# Patient Record
Sex: MALE | Race: WHITE | HISPANIC OR LATINO | Employment: UNEMPLOYED | URBAN - METROPOLITAN AREA
[De-identification: names, ages, dates, MRNs, and addresses within clinical notes are randomized per-mention and may not be internally consistent; named-entity substitution may affect disease eponyms.]

---

## 2021-09-20 ENCOUNTER — HISTORICAL (OUTPATIENT)
Dept: RADIOLOGY | Facility: HOSPITAL | Age: 36
End: 2021-09-20

## 2021-10-14 ENCOUNTER — HISTORICAL (OUTPATIENT)
Dept: LAB | Facility: HOSPITAL | Age: 36
End: 2021-10-14

## 2023-06-13 ENCOUNTER — HOSPITAL ENCOUNTER (EMERGENCY)
Facility: HOSPITAL | Age: 38
Discharge: LEFT BEFORE DISCHARGE | End: 2023-06-13
Attending: EMERGENCY MEDICINE
Payer: MEDICAID

## 2023-06-13 VITALS
TEMPERATURE: 99 F | HEART RATE: 68 BPM | SYSTOLIC BLOOD PRESSURE: 127 MMHG | WEIGHT: 126.81 LBS | BODY MASS INDEX: 21.13 KG/M2 | DIASTOLIC BLOOD PRESSURE: 76 MMHG | HEIGHT: 65 IN | OXYGEN SATURATION: 100 % | RESPIRATION RATE: 18 BRPM

## 2023-06-13 DIAGNOSIS — R68.84 JAW PAIN: Primary | ICD-10-CM

## 2023-06-13 PROCEDURE — 99283 EMERGENCY DEPT VISIT LOW MDM: CPT
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
## 2023-06-26 ENCOUNTER — HOSPITAL ENCOUNTER (EMERGENCY)
Facility: HOSPITAL | Age: 38
Discharge: HOME | End: 2023-06-26
Attending: STUDENT IN AN ORGANIZED HEALTH CARE EDUCATION/TRAINING PROGRAM
Payer: MEDICAID

## 2023-06-26 VITALS
HEART RATE: 70 BPM | OXYGEN SATURATION: 99 % | SYSTOLIC BLOOD PRESSURE: 128 MMHG | TEMPERATURE: 98 F | RESPIRATION RATE: 16 BRPM | HEIGHT: 65 IN | WEIGHT: 125 LBS | DIASTOLIC BLOOD PRESSURE: 72 MMHG | BODY MASS INDEX: 20.83 KG/M2

## 2023-06-26 DIAGNOSIS — R42 DIZZINESS: ICD-10-CM

## 2023-06-26 DIAGNOSIS — R51.9 NONINTRACTABLE HEADACHE, UNSPECIFIED CHRONICITY PATTERN, UNSPECIFIED HEADACHE TYPE: Primary | ICD-10-CM

## 2023-06-26 LAB
ALBUMIN SERPL-MCNC: 4 G/DL (ref 3.5–5)
ALBUMIN/GLOB SERPL: 1.4 RATIO (ref 1.1–2)
ALP SERPL-CCNC: 93 UNIT/L (ref 40–150)
ALT SERPL-CCNC: 34 UNIT/L (ref 0–55)
AST SERPL-CCNC: 27 UNIT/L (ref 5–34)
BASOPHILS # BLD AUTO: 0.08 X10(3)/MCL
BASOPHILS NFR BLD AUTO: 1.1 %
BILIRUBIN DIRECT+TOT PNL SERPL-MCNC: 0.3 MG/DL
BUN SERPL-MCNC: 23.4 MG/DL (ref 8.9–20.6)
CALCIUM SERPL-MCNC: 9 MG/DL (ref 8.4–10.2)
CHLORIDE SERPL-SCNC: 108 MMOL/L (ref 98–107)
CO2 SERPL-SCNC: 26 MMOL/L (ref 22–29)
CREAT SERPL-MCNC: 0.81 MG/DL (ref 0.73–1.18)
EOSINOPHIL # BLD AUTO: 0.45 X10(3)/MCL (ref 0–0.9)
EOSINOPHIL NFR BLD AUTO: 6.1 %
ERYTHROCYTE [DISTWIDTH] IN BLOOD BY AUTOMATED COUNT: 13.5 % (ref 11.5–17)
GFR SERPLBLD CREATININE-BSD FMLA CKD-EPI: >60 MLS/MIN/1.73/M2
GLOBULIN SER-MCNC: 2.9 GM/DL (ref 2.4–3.5)
GLUCOSE SERPL-MCNC: 73 MG/DL (ref 74–100)
HCT VFR BLD AUTO: 42 % (ref 42–52)
HGB BLD-MCNC: 14.4 G/DL (ref 14–18)
IMM GRANULOCYTES # BLD AUTO: 0.02 X10(3)/MCL (ref 0–0.04)
IMM GRANULOCYTES NFR BLD AUTO: 0.3 %
LYMPHOCYTES # BLD AUTO: 3 X10(3)/MCL (ref 0.6–4.6)
LYMPHOCYTES NFR BLD AUTO: 40.5 %
MAGNESIUM SERPL-MCNC: 1.9 MG/DL (ref 1.6–2.6)
MCH RBC QN AUTO: 29.1 PG (ref 27–31)
MCHC RBC AUTO-ENTMCNC: 34.3 G/DL (ref 33–36)
MCV RBC AUTO: 84.8 FL (ref 80–94)
MONOCYTES # BLD AUTO: 0.67 X10(3)/MCL (ref 0.1–1.3)
MONOCYTES NFR BLD AUTO: 9.1 %
NEUTROPHILS # BLD AUTO: 3.18 X10(3)/MCL (ref 2.1–9.2)
NEUTROPHILS NFR BLD AUTO: 42.9 %
NRBC BLD AUTO-RTO: 0 %
PLATELET # BLD AUTO: 282 X10(3)/MCL (ref 130–400)
PMV BLD AUTO: 9.9 FL (ref 7.4–10.4)
POTASSIUM SERPL-SCNC: 4.3 MMOL/L (ref 3.5–5.1)
PROT SERPL-MCNC: 6.9 GM/DL (ref 6.4–8.3)
RBC # BLD AUTO: 4.95 X10(6)/MCL (ref 4.7–6.1)
SODIUM SERPL-SCNC: 140 MMOL/L (ref 136–145)
TROPONIN I SERPL-MCNC: <0.01 NG/ML (ref 0–0.04)
WBC # SPEC AUTO: 7.4 X10(3)/MCL (ref 4.5–11.5)

## 2023-06-26 PROCEDURE — 99285 EMERGENCY DEPT VISIT HI MDM: CPT | Mod: 25

## 2023-06-26 PROCEDURE — 83735 ASSAY OF MAGNESIUM: CPT | Performed by: STUDENT IN AN ORGANIZED HEALTH CARE EDUCATION/TRAINING PROGRAM

## 2023-06-26 PROCEDURE — 96375 TX/PRO/DX INJ NEW DRUG ADDON: CPT

## 2023-06-26 PROCEDURE — 85025 COMPLETE CBC W/AUTO DIFF WBC: CPT | Performed by: STUDENT IN AN ORGANIZED HEALTH CARE EDUCATION/TRAINING PROGRAM

## 2023-06-26 PROCEDURE — 63600175 PHARM REV CODE 636 W HCPCS: Performed by: STUDENT IN AN ORGANIZED HEALTH CARE EDUCATION/TRAINING PROGRAM

## 2023-06-26 PROCEDURE — 96361 HYDRATE IV INFUSION ADD-ON: CPT

## 2023-06-26 PROCEDURE — 84484 ASSAY OF TROPONIN QUANT: CPT | Performed by: STUDENT IN AN ORGANIZED HEALTH CARE EDUCATION/TRAINING PROGRAM

## 2023-06-26 PROCEDURE — 93010 ELECTROCARDIOGRAM REPORT: CPT | Mod: ,,, | Performed by: STUDENT IN AN ORGANIZED HEALTH CARE EDUCATION/TRAINING PROGRAM

## 2023-06-26 PROCEDURE — 25000003 PHARM REV CODE 250: Performed by: STUDENT IN AN ORGANIZED HEALTH CARE EDUCATION/TRAINING PROGRAM

## 2023-06-26 PROCEDURE — 93010 EKG 12-LEAD: ICD-10-PCS | Mod: ,,, | Performed by: STUDENT IN AN ORGANIZED HEALTH CARE EDUCATION/TRAINING PROGRAM

## 2023-06-26 PROCEDURE — 96374 THER/PROPH/DIAG INJ IV PUSH: CPT

## 2023-06-26 PROCEDURE — 80053 COMPREHEN METABOLIC PANEL: CPT | Performed by: STUDENT IN AN ORGANIZED HEALTH CARE EDUCATION/TRAINING PROGRAM

## 2023-06-26 PROCEDURE — 93005 ELECTROCARDIOGRAM TRACING: CPT

## 2023-06-26 RX ORDER — ACETAMINOPHEN 10 MG/ML
1000 INJECTION, SOLUTION INTRAVENOUS ONCE
Status: COMPLETED | OUTPATIENT
Start: 2023-06-26 | End: 2023-06-26

## 2023-06-26 RX ORDER — DIPHENHYDRAMINE HYDROCHLORIDE 50 MG/ML
50 INJECTION INTRAMUSCULAR; INTRAVENOUS
Status: COMPLETED | OUTPATIENT
Start: 2023-06-26 | End: 2023-06-26

## 2023-06-26 RX ORDER — ASPIRIN 81 MG/1
81 TABLET ORAL DAILY
Qty: 30 TABLET | Refills: 0 | Status: SHIPPED | OUTPATIENT
Start: 2023-06-26 | End: 2023-07-26

## 2023-06-26 RX ORDER — MECLIZINE HYDROCHLORIDE 25 MG/1
25 TABLET ORAL 3 TIMES DAILY PRN
Qty: 20 TABLET | Refills: 0 | Status: SHIPPED | OUTPATIENT
Start: 2023-06-26

## 2023-06-26 RX ORDER — KETOROLAC TROMETHAMINE 30 MG/ML
30 INJECTION, SOLUTION INTRAMUSCULAR; INTRAVENOUS
Status: COMPLETED | OUTPATIENT
Start: 2023-06-26 | End: 2023-06-26

## 2023-06-26 RX ORDER — ONDANSETRON 2 MG/ML
8 INJECTION INTRAMUSCULAR; INTRAVENOUS
Status: COMPLETED | OUTPATIENT
Start: 2023-06-26 | End: 2023-06-26

## 2023-06-26 RX ORDER — PROCHLORPERAZINE EDISYLATE 5 MG/ML
10 INJECTION INTRAMUSCULAR; INTRAVENOUS
Status: COMPLETED | OUTPATIENT
Start: 2023-06-26 | End: 2023-06-26

## 2023-06-26 RX ORDER — MECLIZINE HYDROCHLORIDE 25 MG/1
50 TABLET ORAL
Status: COMPLETED | OUTPATIENT
Start: 2023-06-26 | End: 2023-06-26

## 2023-06-26 RX ADMIN — MECLIZINE HYDROCHLORIDE 50 MG: 25 TABLET ORAL at 10:06

## 2023-06-26 RX ADMIN — PROCHLORPERAZINE EDISYLATE 10 MG: 5 INJECTION INTRAMUSCULAR; INTRAVENOUS at 11:06

## 2023-06-26 RX ADMIN — KETOROLAC TROMETHAMINE 30 MG: 30 INJECTION, SOLUTION INTRAMUSCULAR; INTRAVENOUS at 11:06

## 2023-06-26 RX ADMIN — DIPHENHYDRAMINE HYDROCHLORIDE 50 MG: 50 INJECTION INTRAMUSCULAR; INTRAVENOUS at 11:06

## 2023-06-26 RX ADMIN — ACETAMINOPHEN 1000 MG: 10 INJECTION, SOLUTION INTRAVENOUS at 10:06

## 2023-06-26 RX ADMIN — ONDANSETRON 8 MG: 2 INJECTION INTRAMUSCULAR; INTRAVENOUS at 09:06

## 2023-06-26 RX ADMIN — SODIUM CHLORIDE, POTASSIUM CHLORIDE, SODIUM LACTATE AND CALCIUM CHLORIDE 1000 ML: 600; 310; 30; 20 INJECTION, SOLUTION INTRAVENOUS at 11:06

## 2023-06-26 NOTE — ED PROVIDER NOTES
Encounter Date: 6/26/2023    SCRIBE #1 NOTE: I, Mirela Hoffman, am scribing for, and in the presence of,  Gordy Pan IV, MD. I have scribed the following portions of the note - Other sections scribed: HPI, ROS and physical.     History     Chief Complaint   Patient presents with    Shortness of Breath     C/O SOB and dizziness with nausea x4 weeks. Denies CP. EMS states quit meth 3 weeks ago     This is a 39 y/o male with a medial hx of bipolar disorder and substance abuse (methamphetamine) that presents to the ED for headache, dizziness and intermittent SOB. The pt states that the dizziness is worse with movement, much improved when laying still. He reports that it feels as if the room is spinning. The pt's spouse notes that he has been having issues with dizziness for years, but has never been diagnosed with Vertigo, etc.     Per Chart review: the pt was seen in the ED for jaw pain two weeks ago and eloped.     The history is provided by the patient, the EMS personnel and the spouse. No  was used.   Review of patient's allergies indicates:  No Known Allergies  Past Medical History:   Diagnosis Date    Bipolar 1 disorder      No past surgical history on file.  No family history on file.  Social History     Tobacco Use    Smoking status: Every Day     Packs/day: 1.00     Types: Cigarettes   Substance Use Topics    Alcohol use: Yes     Comment: Occasional    Drug use: Yes     Types: Methamphetamines     Comment: Heroin     Review of Systems   Constitutional:  Negative for chills and fever.   HENT:  Negative for congestion, rhinorrhea and sore throat.    Eyes:  Negative for visual disturbance.   Respiratory:  Positive for shortness of breath. Negative for cough.    Cardiovascular:  Negative for chest pain.   Gastrointestinal:  Negative for abdominal pain, nausea and vomiting.   Genitourinary:  Negative for dysuria and hematuria.   Musculoskeletal:  Negative for joint swelling.   Skin:  Negative  for rash.   Neurological:  Positive for dizziness and headaches. Negative for weakness.   Psychiatric/Behavioral:  Negative for confusion.    All other systems reviewed and are negative.    Physical Exam     Initial Vitals [06/26/23 0833]   BP Pulse Resp Temp SpO2   123/84 88 14 98.3 °F (36.8 °C) 99 %      MAP       --         Physical Exam    Nursing note and vitals reviewed.  Constitutional: He is not diaphoretic. No distress.   Cardiovascular:  Normal rate and regular rhythm.           Pulmonary/Chest: No respiratory distress.   Abdominal: Abdomen is soft. He exhibits no distension. There is no abdominal tenderness.     Neurological: He is alert and oriented to person, place, and time. He has normal strength. No cranial nerve deficit or sensory deficit.   Psychiatric: He has a normal mood and affect.       ED Course   Procedures  Labs Reviewed   COMPREHENSIVE METABOLIC PANEL - Abnormal; Notable for the following components:       Result Value    Chloride 108 (*)     Glucose Level 73 (*)     Blood Urea Nitrogen 23.4 (*)     All other components within normal limits   MAGNESIUM - Normal   TROPONIN I - Normal   CBC W/ AUTO DIFFERENTIAL    Narrative:     The following orders were created for panel order CBC auto differential.  Procedure                               Abnormality         Status                     ---------                               -----------         ------                     CBC with Differential[[id number]]                            Final result                 Please view results for these tests on the individual orders.   CBC WITH DIFFERENTIAL     EKG Readings: (Independently Interpreted)   Initial Reading: No STEMI. Rhythm: Normal Sinus Rhythm. Heart Rate: 83. Ectopy: No Ectopy. Conduction: Normal. ST Segments: Normal ST Segments. T Waves: Normal.   Time 0852     Imaging Results              CT Head Without Contrast (Final result)  Result time 06/26/23 10:09:55      Final result by Dread GARCIA  MD Johana (06/26/23 10:09:55)                   Impression:      No acute intracranial findings.      Electronically signed by: Dread Vaughn  Date:    06/26/2023  Time:    10:09               Narrative:    EXAMINATION:  CT HEAD WITHOUT CONTRAST    CLINICAL HISTORY:  Dizziness, persistent/recurrent, cardiac or vascular cause suspected;    TECHNIQUE:  CT imaging of the head performed from the skull base to the vertex without intravenous contrast.  mGycm. Automatic exposure control, adjustment of mA/kV or iterative reconstruction technique was used to reduce radiation.    COMPARISON:  27 April 2019    FINDINGS:  There is no acute cortical infarct, hemorrhage or mass lesion.  The ventricles are normal in size.    Visualized paranasal sinuses and mastoid air cells are clear.                                       Medications   ondansetron injection 8 mg (8 mg Intravenous Given 6/26/23 0907)   acetaminophen 1,000 mg/100 mL (10 mg/mL) injection 1,000 mg (0 mg Intravenous Stopped 6/26/23 1109)   meclizine tablet 50 mg (50 mg Oral Given 6/26/23 1054)   prochlorperazine injection Soln 10 mg (10 mg Intravenous Given 6/26/23 1143)   diphenhydrAMINE injection 50 mg (50 mg Intravenous Given 6/26/23 1143)   lactated ringers bolus 1,000 mL (0 mLs Intravenous Stopped 6/26/23 1243)   ketorolac injection 30 mg (30 mg Intravenous Given 6/26/23 1143)              Scribe Attestation:   Scribe #1: I performed the above scribed service and the documentation accurately describes the services I performed. I attest to the accuracy of the note.    Attending Attestation:           Physician Attestation for Scribe:  Physician Attestation Statement for Scribe #1: I, Gordy Pan IV, MD, reviewed documentation, as scribed by Mirela Hoffman in my presence, and it is both accurate and complete.       Medical Decision Making  Problems Addressed:  Dizziness: acute illness or injury that poses a threat to life or bodily  functions  Nonintractable headache, unspecified chronicity pattern, unspecified headache type: acute illness or injury that poses a threat to life or bodily functions      ED assessment:    37 yo with history of polysubstance use presenting for dizziness, headache, other vague symptoms  Reports to me he gave up meth 3 weeks ago  Dizziness described as vertiginous  Neuro exam wnl, able to ambulate unassisted  CT head wnl  Plan was for admission for posterior circulation stroke r/o if symptoms not resolved  Treated with antiemetics, meclizine, headache cocktail with resolution of symptoms, patient coming out of room asking to go home at that point   Discharged with PRN meds, PCP follow up      Differential diagnosis (including but not limited to):   Electrolyte derangement, dehydration, peripheral vertigo, ICH, CVA    ED management:   IV meclizine  IVF  Headache cocktail  Antiemetics     My independent radiology interpretation:   Head CT - no obvious bleed or mass     Amount and/or Complexity of Data Reviewed  Independent historian: EMS   Summary of history: EMS reports that the pt has been clean from methamphetamine for three weeks and has been experiencing dizziness  External data reviewed: notes from previous admissions and notes from previous ED visits  Summary of data reviewed: see ED course   Risk and benefits of testing: discussed   Labs: ordered and reviewed  Radiology: ordered and independent interpretation performed (see above or ED course)  ECG/medicine tests: ordered and independent interpretation performed (see above or ED course)    Risk  Prescription drug management   Diagnosis or treatment significantly impacted by social determinants of health: psychiatric illness  Shared decision making     Critical Care  none    Gordy LEDEZMA MD personally performed the history, PE, MDM, and procedures as documented above and agree with the scribe's documentation.          ED Course as of 06/26/23 1815   Mon Wiliam  26, 2023   1127 Patient is now complaining of a right-sided headache CT scan unremarkable will treat with a headache cocktail at this time and reassess. [AC]   1310 Reassessment: Pt is ambulating out of his room with steady gait. He states that he is ready to go home.  [SJ]      ED Course User Index  [AC] Gordy Pan IV, MD  [SJ] Mirela Hoffman                 Clinical Impression:   Final diagnoses:  [R42] Dizziness  [R51.9] Nonintractable headache, unspecified chronicity pattern, unspecified headache type (Primary)        ED Disposition Condition    Discharge Stable          ED Prescriptions       Medication Sig Dispense Start Date End Date Auth. Provider    aspirin (ECOTRIN) 81 MG EC tablet Take 1 tablet (81 mg total) by mouth once daily. 30 tablet 6/26/2023 7/26/2023 Gordy Pan IV, MD    meclizine (ANTIVERT) 25 mg tablet Take 1 tablet (25 mg total) by mouth 3 (three) times daily as needed. 20 tablet 6/26/2023 -- Gordy Pan IV, MD          Follow-up Information       Follow up With Specialties Details Why Contact Info    [facility] - Emergency Dept Emergency Medicine Go to  If symptoms worsen [address] [facility] [address] [phone]    Primary care physician  Schedule an appointment as soon as possible for a visit   Follow up with you primary care physician.   If you do not have a primary care physician call [phone] to schedule an appointment.             Gordy Pan IV, MD  06/26/23 9609

## 2023-06-26 NOTE — DISCHARGE INSTRUCTIONS
Agency:  Contact Information:  Services Provided:  Insurance Accepted:    [facility] [address] [place] [address]  [phone] Adults: Detox; inpatient; outpatient; partial inpatient Private Insurance    [facility] [place] [address], [address]th [place] [address]  [phone] Adults: Detox; inpatient Private Insurance   [facility] [address] [place] [address]  [phone] Adults and Adolescents (13-17): Inpatient; outpatient; transitional living; family therapy Medicare  Medicaid  Private Pay  [employer] (Uninsured)    [address] [place] [address]  [phone] Adults: Detox; inpatient; pregnant women Medicaid  Private Insurance   [facility] [address] [place] [address]  [phone] Adults: Detox; Detox for pregnant women Medicare    Private Insurance  Medicaid   [facility] [address] [place] [address]  [phone] Adults: Detox; inpatient; group therapy Medicare  Medicaid  Private Insurance   [facility] [address] [place], [place] (Adult)     [address] [place], [place] (Adolescent)    [phone] (Weekdays)  [phone] (Weekends) Adults and Adolescents (12-17): Inpatient; outpatient  Family: Pregnant women and women w/ kids up to 12-residental living recovery  Medicaid  Medicare  Private Insurance   [facility] [address] [place] [address]  [phone]. [phone] Adults: Detox; Inpatient Medicaid  Private Insurance   [facility] [address]  [place] [address]  [phone] Adults: Outpatient Private Insurance    [facility] [address] [place] [address]  [phone] Adult: Detox; inpatient Medicaid  Medicare    Private Insurance   [facility] [address]  [place] [address]  [phone] Adults: Outpatient   Medicaid  Private Insurance  Self-pay   [facility] [address] [place] [address]  [phone] Adults: Detox; Inpatient   Medicaid  Medicare  Private Insurance  VA Benefits   [facility] [address]  [place] [address]  [phone] All Ages: Substance abuse services and referrals for medical detox. Self pay, Medicaid, Medicare, Private insurance   [facility] [address] [place] [address]  [phone]    [address] [place] [address]  [phone] Adults: Outpatient; outpatient for use during pregnancy  No insurance accepted  Private pay only    [facility] [address], [place] [address]  [phone] Adults: Inpatient Medicare  Private Insurance    [facility] [address] [place], [place] [address] [phone] Adults: Detox; inpatient; relapse program; intensive outpatient; family therapy Medicaid  Private Insurance    [address] [id number] [phone] Adults: Detox; inpatient  Medicaid  Private Insurance    [facility] [address] [place], [place] [address] [phone] Adults: Detox; inpatient; treatment for pregnant women Medicaid  [place] [employer] (Uninsured)   [facility] [address] [place] [address]  [phone] Adults: Inpatient; Partial inpatient; outpatient Medicaid  Private Insurance   [facility] [address]  [place] [address]  [phone] Adults: Outpatient Private Insurance    [facility] [address] [place] [address]  [phone] Adults: Inpatient; outpatient; family support; aftercare support Private Insurance   [facility]  [address]  [place] [address]  [phone] Adults and Adolescents (12-17): Detox; outpatient Medicaid  Medicare  Private Insurance   [facility] [address]  [place] [address]  [phone] Adults: Detox, Inpatient, Outpatient Private Insurance   24 HR Hotline:       [facility]  1-579.895.5608 (HELP) Free 24 HR assistance  N/A   National Helpline for Substance abuse 1-319.403.2209 Free 24 HR assistance N/A   [place]ocaine Anonymous 1-836.465.9117 Free 24 HR assistance  N/A   Poison Control-Overdose Hotline 1-540.866.6493 Free 24 HR assistance   N/A   Alcohol and Drug Helpline 1-456.191.8386 Free 24 HR assistance  N/A   National [place] of   Problem Gambling Helpline 1-869.998.7393 Free 24 HR assistance N/A         Thanks for letting use take care of you today! It is our goal to give you courteous care and to keep you comfortable and informed. If you have any questions before you leave I will be happy to try and answer them.     Advice after your visit:  Your visit in the emergency department is NOT definitive care - please follow-up with your primary care doctor and/or specialist within 1-2 days. If you do not have a primary care physician call [phone] to schedule an appointment. Please return if you have any worsening in your condition or if you have any other concerns.    Return to the emergency department if any worsening symptoms including fever, chest pain, difficulty breathing, weakness, numbness, tingling, nausea, vomiting, inability to eat, drink or take your medication, or any other new symptoms or concerns arise.      Please signup for MyChart as noted below in your paperwork to review all labwork, imaging results, and any other incidental findings from today's visit.     If you had radiology exams like an XRAY or CT in the emergency Department the interpreation on them may be preliminary - there may be less time sensitive findings on the reports please obtain these reports within 24 hours from the hospital or by using your out on your mobile phone to access records.   Bring these to your primary care doctor and/or specialist for further review of incidental findings.    Please review any LAB WORK from your visit today with your primary care physician.    If you were prescribed OPIATE PAIN MEDICATION - please understand of these medications can be addictive, you may fill less of the prescription was written for, you do not have to take the full prescription.  You may discard what you do not use.  Please seek help if you feel you are having problems with addiction.  Do not drive or operate heavy machinery if you are taking sedating medications.  Do not mix these medications with alcohol.      If you had a SPLINT placed in the emergency department if you have severe pain numbness tingling or discoloration of year digits please remove the splint and return to the emergency department for further evaluation as this may represent a sign of compromise to the nerves or blood vessels due to swelling.    If you had SUTURES in the emergency department please have them removed in the prescribed time frame typically within 7-14 days.  You may shower but please do not bathe or swim.  Keep the wounds clean and dry and covered with a clean dressing.  Please return if he have any signs of infection like redness or drainage or pain at the suture site.    Please take the full course of  any ANTIBIOTICS you were prescribed - incomplete courses of antibiotics can cause resistance to antibiotics in the future which will make it difficult to treat any infections you may have.

## 2023-07-06 ENCOUNTER — HOSPITAL ENCOUNTER (EMERGENCY)
Facility: HOSPITAL | Age: 38
Discharge: HOME | End: 2023-07-06
Attending: INTERNAL MEDICINE
Payer: MEDICAID

## 2023-07-06 VITALS
DIASTOLIC BLOOD PRESSURE: 86 MMHG | TEMPERATURE: 98 F | HEART RATE: 65 BPM | HEIGHT: 65 IN | BODY MASS INDEX: 23.28 KG/M2 | RESPIRATION RATE: 20 BRPM | OXYGEN SATURATION: 100 % | WEIGHT: 139.75 LBS | SYSTOLIC BLOOD PRESSURE: 122 MMHG

## 2023-07-06 DIAGNOSIS — T40.725A ADVERSE EFFECT OF SYNTHETIC CANNABINOID, INITIAL ENCOUNTER: Primary | ICD-10-CM

## 2023-07-06 LAB
AMPHET UR QL SCN: NEGATIVE
BARBITURATE SCN PRESENT UR: NEGATIVE
BENZODIAZ UR QL SCN: NEGATIVE
CANNABINOIDS UR QL SCN: POSITIVE
COCAINE UR QL SCN: NEGATIVE
FENTANYL UR QL SCN: NEGATIVE
MDMA UR QL SCN: NEGATIVE
OPIATES UR QL SCN: NEGATIVE
PCP UR QL: NEGATIVE
PH UR: 7.5 [PH] (ref 3–11)

## 2023-07-06 PROCEDURE — 99284 EMERGENCY DEPT VISIT MOD MDM: CPT

## 2023-07-06 PROCEDURE — 63600175 PHARM REV CODE 636 W HCPCS: Performed by: PHYSICIAN ASSISTANT

## 2023-07-06 PROCEDURE — 96372 THER/PROPH/DIAG INJ SC/IM: CPT | Performed by: PHYSICIAN ASSISTANT

## 2023-07-06 PROCEDURE — 80307 DRUG TEST PRSMV CHEM ANLYZR: CPT | Performed by: PHYSICIAN ASSISTANT

## 2023-07-06 RX ORDER — HYDROXYZINE 50 MG/ML
50 INJECTION, SOLUTION INTRAMUSCULAR ONCE
Status: COMPLETED | OUTPATIENT
Start: 2023-07-06 | End: 2023-07-06

## 2023-07-06 RX ADMIN — HYDROXYZINE HYDROCHLORIDE 50 MG: 50 INJECTION, SOLUTION INTRAMUSCULAR at 11:07

## 2023-07-06 NOTE — DISCHARGE INSTRUCTIONS
Report to Emergency Department if symptoms return or worsen; [facility] Within 1 to 2 days, It is important that you follow up with your primary care provider or specialist if indicated for further evaluation, workup, and treatment as necessary. The exam and treatment you received in Emergency Department was for an urgent problem and NOT INTENDED AS COMPLETE CARE. It is important that you FOLLOW UP with a doctor for ongoing care. If your symptoms become WORSE or you DO NOT IMPROVE and you are unable to reach your health care provider, you should RETURN to the Emergency Department. The Emergency Department provider has provided a PRELIMINARY INTERPRETATION of all your studies. A final interpretation may be done after you are discharged. If a change in your diagnosis or treatment is needed WE WILL CONTACT YOU. It is critical that we have a CURRENT PHONE NUMBER FOR YOU.

## 2023-07-06 NOTE — ED PROVIDER NOTES
Encounter Date: 7/6/2023       History     Chief Complaint   Patient presents with    Drug / Alcohol Assessment     C/o smoked marijuana yesterday and today is jittery and agitated. Feels as if was laced with something different.      39 yo M w/ PMHx significant for bipolar disorder presents to ED c/o feeling jittery & agitated after smoking marijuana yesterday. Reports he smokes marijuana occasionally & has never felt like this. Reports about [facility] through smoking marijuana joint he noticed it smelled different than marijuana normally does. Denies any other recent drug use. Denies SI, HI, hallucinations, HA, dizziness, confusion, CP, SOB, palpitations, diaphoresis or syncope. VSS on arrival, patient in NAD.    Review of patient's allergies indicates:  No Known Allergies  Past Medical History:   Diagnosis Date    Bipolar 1 disorder      History reviewed. No pertinent surgical history.  History reviewed. No pertinent family history.  Social History     Tobacco Use    Smoking status: Every Day     Packs/day: 1.00     Types: Cigarettes   Substance Use Topics    Alcohol use: Yes     Comment: Occasional    Drug use: Yes     Types: Marijuana     Review of Systems   All other systems reviewed and are negative.    Physical Exam     Initial Vitals [07/06/23 1114]   BP Pulse Resp Temp SpO2   134/87 78 20 98.6 °F (37 °C) 100 %      MAP       --         Physical Exam    Nursing note and vitals reviewed.  Constitutional: He appears well-developed and well-nourished. He is not diaphoretic. No distress.   HENT:   Head: Normocephalic and atraumatic.   Eyes: Conjunctivae and EOM are normal. Pupils are equal, round, and reactive to light. No scleral icterus.   Neck: Neck supple.   Normal range of motion.  Cardiovascular:  Normal rate, regular rhythm, normal heart sounds and intact distal pulses.     Exam reveals no gallop and no friction rub.       No murmur heard.  Pulmonary/Chest: Breath sounds normal. No respiratory distress. He  has no wheezes. He has no rhonchi. He has no rales.   Abdominal: Abdomen is soft. Bowel sounds are normal. He exhibits no distension. There is no abdominal tenderness. There is no rebound and no guarding.   Musculoskeletal:         General: No tenderness or edema. Normal range of motion.      Cervical back: Normal range of motion and neck supple.     Neurological: He is alert and oriented to person, place, and time. No cranial nerve deficit or sensory deficit. GCS score is 15. GCS eye subscore is 4. GCS verbal subscore is 5. GCS motor subscore is 6.   Skin: Skin is warm and dry. Capillary refill takes less than 2 seconds. No pallor.   Psychiatric: His behavior is normal. Judgment and thought content normal. His mood appears anxious. His affect is not angry, not blunt, not labile and not inappropriate. His speech is rapid and/or pressured. His speech is not delayed, not tangential and not slurred. He is not actively hallucinating. Cognition and memory are normal. He does not exhibit a depressed mood. He is communicative. He is attentive.       ED Course   Procedures  Labs Reviewed   DRUG SCREEN, URINE (BEAKER) - Abnormal; Notable for the following components:       Result Value    Cannabinoids, Urine Positive (*)     All other components within normal limits    Narrative:     Cut off concentrations:    Amphetamines - 1000 ng/ml  Barbiturates - 200 ng/ml  Benzodiazepine - 200 ng/ml  Cannabinoids (THC) - 50 ng/ml  Cocaine - 300 ng/ml  Fentanyl - 1.0 ng/ml  MDMA - 500 ng/ml  Opiates - 300 ng/ml   Phencyclidine (PCP) - 25 ng/ml    Specimen submitted for drug analysis and tested for pH and specific gravity in order to evaluate sample integrity. Suspect tampering if specific gravity is <1.003 and/or pH is not within the range of 4.5 - 8.0  False negatives may result form substances such as bleach added to urine.  False positives may result for the presence of a substance with similar chemical structure to the drug or its  metabolite.    This test provides only a PRELIMINARY analytical test result. A more specific alternate chemical method must be used in order to obtain a confirmed analytical result. Gas chromatography/mass spectrometry (GC/MS) is the preferred confirmatory method. Other chemical confirmation methods are available. Clinical consideration and professional judgement should be applied to any drug of abuse test result, particularly when preliminary positive results are used.    Positive results will be confirmed only at the physicians request. Unconfirmed screening results are to be used only for medical purposes (treatment).               Imaging Results    None          Medications   hydrOXYzine injection 50 mg (50 mg Intramuscular Given 7/6/23 2772)     Medical Decision Making:   Clinical Tests:   Lab Tests: Ordered and Reviewed  UDS positive only for marijuana. Explained to patient that he may have smoked synthetic marijuana & that would not show up on standard UDS. Patient continues to deny SI, HI or hallucinations. Displays good insight & judgement. Nontoxic appearing w/ normal vitals, stable for discharge. Referral placed to IM clinic for follow-up. ED precautions given for new or worsening symptoms.                        Clinical Impression:   Final diagnoses:  [T40.725A] Adverse effect of synthetic cannabinoid, initial encounter (Primary)        ED Disposition Condition    Discharge Good          ED Prescriptions    None       Follow-up Information       Follow up With Specialties Details Why Contact Info Additional Information    [facility] Internal Medicine In 2 weeks  [address] [id number] [phone] Internal Medicine Clinic Entrance #1    [facility] Emergency Medicine  As needed, If symptoms worsen [address] [facility] [id number] [phone]              TAWANDA Man  07/06/23 1224

## 2024-03-06 ENCOUNTER — HOSPITAL ENCOUNTER (EMERGENCY)
Facility: HOSPITAL | Age: 39
Discharge: OTHER INSTITUTION | End: 2024-03-06
Attending: STUDENT IN AN ORGANIZED HEALTH CARE EDUCATION/TRAINING PROGRAM
Payer: MEDICAID

## 2024-03-06 VITALS
RESPIRATION RATE: 18 BRPM | BODY MASS INDEX: 24.99 KG/M2 | DIASTOLIC BLOOD PRESSURE: 86 MMHG | WEIGHT: 150 LBS | HEART RATE: 71 BPM | HEIGHT: 65 IN | SYSTOLIC BLOOD PRESSURE: 126 MMHG | OXYGEN SATURATION: 99 % | TEMPERATURE: 98 F

## 2024-03-06 DIAGNOSIS — F19.10 SUBSTANCE ABUSE: Primary | ICD-10-CM

## 2024-03-06 LAB
AMPHET UR QL SCN: NEGATIVE
BARBITURATE SCN PRESENT UR: NEGATIVE
BENZODIAZ UR QL SCN: NEGATIVE
CANNABINOIDS UR QL SCN: NEGATIVE
COCAINE UR QL SCN: NEGATIVE
ETHANOL SERPL-MCNC: <10 MG/DL
FENTANYL UR QL SCN: NEGATIVE
HOLD SPECIMEN: NORMAL
MDMA UR QL SCN: NEGATIVE
OPIATES UR QL SCN: NEGATIVE
PCP UR QL: NEGATIVE
PH UR: 5.5 [PH] (ref 3–11)
SPECIFIC GRAVITY, URINE AUTO (.000) (OHS): 1.02 (ref 1–1.03)

## 2024-03-06 PROCEDURE — 99283 EMERGENCY DEPT VISIT LOW MDM: CPT

## 2024-03-06 PROCEDURE — 80307 DRUG TEST PRSMV CHEM ANLYZR: CPT | Performed by: STUDENT IN AN ORGANIZED HEALTH CARE EDUCATION/TRAINING PROGRAM

## 2024-03-06 PROCEDURE — 82077 ASSAY SPEC XCP UR&BREATH IA: CPT | Performed by: STUDENT IN AN ORGANIZED HEALTH CARE EDUCATION/TRAINING PROGRAM

## 2024-03-06 NOTE — ED PROVIDER NOTES
Encounter Date: 3/6/2024       History     Chief Complaint   Patient presents with    Psychiatric Evaluation     Patient brought in for psychiatric evaluation by ems. Denies HI/SI/AH/VH. States only wants detox.     Patient presents to the emergency department requesting detox.  He was states he wants to go to rehab facility to help him get off drugs.  He reports last use today.  Denies any homicidal, suicidal ideation or hallucinations.    The history is provided by the patient.     Review of patient's allergies indicates:  No Known Allergies  Past Medical History:   Diagnosis Date    Bipolar 1 disorder      No past surgical history on file.  No family history on file.  Social History     Tobacco Use    Smoking status: Every Day     Current packs/day: 1.00     Types: Cigarettes   Substance Use Topics    Alcohol use: Yes     Comment: Occasional    Drug use: Yes     Types: Marijuana     Review of Systems   Constitutional:  Negative for chills and fever.   HENT:  Negative for congestion and sore throat.    Respiratory:  Negative for cough and shortness of breath.    Cardiovascular:  Negative for chest pain and palpitations.   Gastrointestinal:  Negative for abdominal pain and nausea.   Genitourinary:  Negative for dysuria and hematuria.   Musculoskeletal:  Negative for arthralgias and myalgias.   Neurological:  Negative for dizziness and weakness.       Physical Exam     Initial Vitals [03/06/24 1212]   BP Pulse Resp Temp SpO2   126/86 71 18 98.3 °F (36.8 °C) 99 %      MAP       --         Physical Exam    Nursing note and vitals reviewed.  Constitutional: He appears well-developed and well-nourished.   HENT:   Head: Normocephalic and atraumatic.   Eyes: Conjunctivae are normal. Pupils are equal, round, and reactive to light.   Neck: Neck supple.   Normal range of motion.  Cardiovascular:  Normal rate and regular rhythm.           Pulmonary/Chest: Breath sounds normal. No respiratory distress.   Abdominal: Abdomen is  soft. There is no abdominal tenderness.   Musculoskeletal:         General: No edema. Normal range of motion.      Cervical back: Normal range of motion and neck supple.     Neurological: He is alert and oriented to person, place, and time.   Skin: Skin is warm and dry.         ED Course   Procedures  Labs Reviewed   DRUG SCREEN, URINE (BEAKER) - Normal    Narrative:     Cut off concentrations:    Amphetamines - 1000 ng/ml  Barbiturates - 200 ng/ml  Benzodiazepine - 200 ng/ml  Cannabinoids (THC) - 50 ng/ml  Cocaine - 300 ng/ml  Fentanyl - 1.0 ng/ml  MDMA - 500 ng/ml  Opiates - 300 ng/ml   Phencyclidine (PCP) - 25 ng/ml    Specimen submitted for drug analysis and tested for pH and specific gravity in order to evaluate sample integrity. Suspect tampering if specific gravity is <1.003 and/or pH is not within the range of 4.5 - 8.0  False negatives may result form substances such as bleach added to urine.  False positives may result for the presence of a substance with similar chemical structure to the drug or its metabolite.    This test provides only a PRELIMINARY analytical test result. A more specific alternate chemical method must be used in order to obtain a confirmed analytical result. Gas chromatography/mass spectrometry (GC/MS) is the preferred confirmatory method. Other chemical confirmation methods are available. Clinical consideration and professional judgement should be applied to any drug of abuse test result, particularly when preliminary positive results are used.    Positive results will be confirmed only at the physicians request. Unconfirmed screening results are to be used only for medical purposes (treatment).        ALCOHOL,MEDICAL (ETHANOL) - Normal   EXTRA TUBES    Narrative:     The following orders were created for panel order EXTRA TUBES.  Procedure                               Abnormality         Status                     ---------                               -----------         ------                      Lavender Top Hold[[id number]]                                In process                   Please view results for these tests on the individual orders.   LAVENDER TOP HOLD          Imaging Results    None          Medications - No data to display  Medical Decision Making  [facility] was contacted and patient was found placement into rehab facility, will go there by hospitals.                                      Clinical Impression:  Final diagnoses:  [F19.10] Substance abuse (Primary)          ED Disposition Condition    Discharge Stable          ED Prescriptions    None       Follow-up Information    None          Devyn Bo MD  03/06/24 [id number]

## 2024-08-15 ENCOUNTER — HOSPITAL ENCOUNTER (EMERGENCY)
Facility: HOSPITAL | Age: 39
Discharge: LEFT BEFORE DISCHARGE | End: 2024-08-15
Attending: EMERGENCY MEDICINE
Payer: MEDICAID

## 2024-08-15 VITALS
BODY MASS INDEX: 24.63 KG/M2 | RESPIRATION RATE: 16 BRPM | HEART RATE: 76 BPM | SYSTOLIC BLOOD PRESSURE: 99 MMHG | TEMPERATURE: 98 F | OXYGEN SATURATION: 98 % | WEIGHT: 148 LBS | DIASTOLIC BLOOD PRESSURE: 65 MMHG

## 2024-08-15 DIAGNOSIS — R55 SYNCOPE: ICD-10-CM

## 2024-08-15 PROCEDURE — 93005 ELECTROCARDIOGRAM TRACING: CPT

## 2024-08-15 PROCEDURE — 99283 EMERGENCY DEPT VISIT LOW MDM: CPT | Mod: 25

## 2024-08-15 NOTE — LETTER
Patient: Jonah Arreaga  YOB: 1985  Date: 8/15/2024 Time: 3:58 PM  Location: [facility]    Leaving the Hospital Against Medical Advice    Chart #:[id number]    This will certify that I, the undersigned,    ______________________________________________________________________    A patient in the above named medical center, having requested discharge and removal from the medical center against the advice of my attending physician(s), hereby release [facility], its physicians, officers and employees, severally and individually, from any and all liability of any nature whatsoever for any injury or harm or complication of any kind that may result directly or indirectly, by reason of my terminating my stay as a patient at [facility] and my departure from [facility], and hereby waive any and all rights of action I may now have or later acquire as a result of my voluntary departure from [facility] and the termination of my stay as a patient therein.    This release is made with the full knowledge of the danger that may result from the action which I am taking.      Date:_______________________                         ___________________________                                                                                    Patient/Legal Representative    Witness:        ____________________________                          ___________________________  Nurse                                                                        Physician

## 2024-08-15 NOTE — ED PROVIDER NOTES
ED PROVIDER NOTE  8/15/2024    CHIEF COMPLAINT:   Chief Complaint   Patient presents with    Loss of Consciousness     Pt in /[facility], FOUND UNCONSCIOUS IN FRONT OF HOMELESS SHELTER.  PT REPORTS PASSED OUT DUE TO HEAT THEN STATES HE WAS JUST TAKING A NAP. PT SPEECH SLOWED.  PT DENIES DRUG USE.  CBG EN ROUTE 94.  IV TO RT ARM 20 G W NS 500CC INFUSED.  EKG OBTAINED.        HISTORY OF PRESENT ILLNESS:   Jonah Arreaga is a 39 y.o. male who presents with chief complaint Syncope. Reports that he was sitting outside the building and took a nap after eating at a diner. He reports that he stays at the shelter and did any drugs.    The history is provided by the patient.         REVIEW OF SYSTEMS: as noted in the HPI.  NURSING NOTES REVIEWED      PAST MEDICAL/SURGICAL HISTORY:   Past Medical History:   Diagnosis Date    Bipolar 1 disorder     History reviewed. No pertinent surgical history.    FAMILY HISTORY: No family history on file.    SOCIAL HISTORY:   Social History     Tobacco Use    Smoking status: Every Day     Current packs/day: 1.00     Types: Cigarettes   Substance Use Topics    Alcohol use: Yes     Comment: Occasional    Drug use: Yes     Types: Marijuana       ALLERGIES: Review of patient's allergies indicates:  No Known Allergies    PHYSICAL EXAM:  Initial Vitals [08/15/24 1519]   BP Pulse Resp Temp SpO2   98/62 83 18 97.9 °F (36.6 °C) 97 %      MAP       --         Physical Exam    Nursing note and vitals reviewed.  Constitutional: He appears well-developed and well-nourished. No distress.   HENT:   Head: Normocephalic and atraumatic.   Nose: Nose normal.   Mouth/Throat: Oropharynx is clear and moist and mucous membranes are normal.   Eyes: Conjunctivae and EOM are normal. Pupils are equal, round, and reactive to light.   Neck: Neck supple. No tracheal deviation present.   Cardiovascular:  Normal rate, regular rhythm, normal heart sounds, intact distal pulses and normal pulses.           Pulmonary/Chest: Effort  normal and breath sounds normal. No respiratory distress.   Abdominal: Abdomen is soft. There is no abdominal tenderness. There is no rebound and no guarding.   Musculoskeletal:         General: Normal range of motion.      Cervical back: Neck supple.     Neurological: He is alert and oriented to person, place, and time. GCS eye subscore is 4. GCS verbal subscore is 5. GCS motor subscore is 6.   CN II-XII intact. Moves all extremities. No gross sensory or motor deficits.   Skin: Skin is warm, dry and intact.   Psychiatric: He has a normal mood and affect. His speech is normal and behavior is normal. Judgment and thought content normal. Cognition and memory are normal.         RESULTS:  Labs Reviewed   DRUG SCREEN, URINE (BEAKER)     Imaging Results    None         PROCEDURES:  Procedures    ECG:  EKG Readings: (Independently Interpreted)   Initial Reading: No STEMI. Rhythm: Normal Sinus Rhythm. Ectopy: No Ectopy. Conduction: Normal. Axis: Normal.       ED COURSE AND MEDICAL DECISION MAKING:  Medications - No data to display        Medical Decision Making  Jonah Arreaga has decided to leave our facility against medical advice. I have assessed the patient's ability to make an informed decision and it is my opinion at this time that the patient has the medical decision-making capacity to comprehend information regarding current medical condition and appreciates the impact of the disease or condition and the consequences of various options for treatment, including foregoing treatment. The patient possesses the ability to evaluate all treatment options, compare the risks and benefits of each option, communicate choice in a consistent manner over time, and is able to make rational choices. I have explained to the patient further testing, treatment, and evaluation I would like to perform during the current emergency department visit as well as any possible alternatives that could be accomplished in a timely manner. I have  outlined the possible risks of foregoing any or all of these interventions and the patient understands and acknowledges that the decision to leave may result in undesirable consequences such as death, permanent disability, and/or loss of current lifestyle. Even though leaving AMA is not ideal, I have instructed the patient to follow any discharge instructions given, take any medications prescribed, and resume care as soon as possible with another provider. Additionally, we clearly stated that the patient is welcome to return at any time to continue care at our facility.     Amount and/or Complexity of Data Reviewed  Labs: ordered.  ECG/medicine tests: ordered and independent interpretation performed.        CLINICAL IMPRESSION:  1. Syncope        DISPOSITION:   ED Disposition Condition    AMA Stable                    Cahva Bee,   08/15/24 2716

## 2024-08-15 NOTE — ED NOTES
Patient informed staff of decision to leave Against Medical Advice (AMA) at 1555 hours. Risks of leaving without treatment, including potential worsening of condition and/or death, were explained, and patient verbalized understanding. AMA form was provided and signed by the patient. AMA form placed in Mrs. Montenegro's bin.

## 2024-08-16 LAB
OHS QRS DURATION: 80 MS
OHS QTC CALCULATION: 435 MS

## 2024-10-21 ENCOUNTER — OFFICE VISIT (OUTPATIENT)
Dept: INTERNAL MEDICINE | Facility: CLINIC | Age: 39
End: 2024-10-21
Payer: MEDICAID

## 2024-10-21 VITALS
RESPIRATION RATE: 18 BRPM | BODY MASS INDEX: 25.93 KG/M2 | DIASTOLIC BLOOD PRESSURE: 71 MMHG | OXYGEN SATURATION: 100 % | WEIGHT: 155.81 LBS | SYSTOLIC BLOOD PRESSURE: 110 MMHG | TEMPERATURE: 98 F | HEART RATE: 56 BPM

## 2024-10-21 DIAGNOSIS — F20.0 PARANOID SCHIZOPHRENIA: ICD-10-CM

## 2024-10-21 DIAGNOSIS — F43.10 PTSD (POST-TRAUMATIC STRESS DISORDER): ICD-10-CM

## 2024-10-21 DIAGNOSIS — Z00.00 WELLNESS EXAMINATION: ICD-10-CM

## 2024-10-21 DIAGNOSIS — F25.0 SCHIZOAFFECTIVE DISORDER, BIPOLAR TYPE: ICD-10-CM

## 2024-10-21 DIAGNOSIS — Z23 NEED FOR VACCINATION: Primary | ICD-10-CM

## 2024-10-21 DIAGNOSIS — R00.1 BRADYCARDIA: ICD-10-CM

## 2024-10-21 DIAGNOSIS — F51.4 NIGHT TERRORS: ICD-10-CM

## 2024-10-21 DIAGNOSIS — Z13.31 POSITIVE DEPRESSION SCREENING: ICD-10-CM

## 2024-10-21 PROBLEM — Z72.0 TOBACCO USER: Status: ACTIVE | Noted: 2024-10-21

## 2024-10-21 PROBLEM — F31.9 BIPOLAR DISORDER: Status: ACTIVE | Noted: 2024-10-21

## 2024-10-21 PROBLEM — F32.A DEPRESSIVE DISORDER: Status: ACTIVE | Noted: 2024-10-21

## 2024-10-21 PROCEDURE — 90471 IMMUNIZATION ADMIN: CPT | Mod: PBBFAC

## 2024-10-21 PROCEDURE — 90677 PCV20 VACCINE IM: CPT | Mod: PBBFAC

## 2024-10-21 PROCEDURE — 99214 OFFICE O/P EST MOD 30 MIN: CPT | Mod: PBBFAC,25

## 2024-10-21 PROCEDURE — 93005 ELECTROCARDIOGRAM TRACING: CPT

## 2024-10-21 PROCEDURE — 90472 IMMUNIZATION ADMIN EACH ADD: CPT | Mod: PBBFAC

## 2024-10-21 PROCEDURE — 90656 IIV3 VACC NO PRSV 0.5 ML IM: CPT | Mod: PBBFAC

## 2024-10-21 RX ORDER — QUETIAPINE FUMARATE 100 MG/1
100 TABLET, FILM COATED ORAL NIGHTLY
Qty: 90 TABLET | Refills: 3 | Status: SHIPPED | OUTPATIENT
Start: 2024-10-21

## 2024-10-21 RX ORDER — ACETAMINOPHEN 500 MG
1 TABLET ORAL NIGHTLY
Qty: 1 EACH | Refills: 0 | Status: SHIPPED | OUTPATIENT
Start: 2024-10-21

## 2024-10-21 RX ORDER — ROPINIROLE 2 MG/1
2 TABLET, FILM COATED ORAL NIGHTLY
COMMUNITY
End: 2024-10-21

## 2024-10-21 RX ORDER — QUETIAPINE FUMARATE 300 MG/1
300 TABLET, FILM COATED ORAL NIGHTLY
COMMUNITY
End: 2024-10-21 | Stop reason: SDUPTHER

## 2024-10-21 RX ORDER — PRAZOSIN HYDROCHLORIDE 1 MG/1
1 CAPSULE ORAL 2 TIMES DAILY
Qty: 60 CAPSULE | Refills: 11 | Status: SHIPPED | OUTPATIENT
Start: 2024-10-21 | End: 2025-10-21

## 2024-10-21 RX ADMIN — PNEUMOCOCCAL 20-VALENT CONJUGATE VACCINE 0.5 ML
2.2; 2.2; 2.2; 2.2; 2.2; 2.2; 2.2; 2.2; 2.2; 2.2; 2.2; 2.2; 2.2; 2.2; 2.2; 2.2; 4.4; 2.2; 2.2; 2.2 INJECTION, SUSPENSION INTRAMUSCULAR at 09:10

## 2024-10-21 RX ADMIN — INFLUENZA VIRUS VACCINE 0.5 ML: 15; 15; 15 SUSPENSION INTRAMUSCULAR at 09:10

## 2024-10-21 NOTE — PROGRESS NOTES
[facility] INTERNAL MEDICINE  OUTPATIENT OFFICE VISIT NOTE    SUBJECTIVE:      Chief Complaint: Establish Care (Medication Refills- all. Vaccines )       HPI: Jonah Arreaga is a 39 y.o. yo male w/ PMH of  has a past medical history of Bipolar 1 disorder., who presents for establishment with myself.    Patient reports in need of physician given he is applying for section 8 housing. He reports a history of PTSD, schizoaffective disorder, night terrors.  Pt seen grinding teeth very loudly throughout interview. He says he is unable to control that. 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 Pt is from [place] where he has been in and out of inpatient psychiatry. Currently lives in a homeless shelter. Not taking any medications.       Past Medical History:   has a past medical history of Bipolar 1 disorder. Schizoaffective disorder    Past Surgical History:   has no past surgical history on file.     Family History:  family history is not on file.     Social History:   reports that he has been smoking cigarettes. He does not have any smokeless tobacco history on file. He reports that he does not currently use alcohol. He reports that he does not currently use drugs after having used the following drugs: Marijuana.     Allergies:  has No Known Allergies.     Home Medications:  Current Outpatient Medications   Medication Instructions    blood pressure test kit-large Kit 1 each, Misc.(Non-Drug; Combo Route), Nightly    prazosin (MINIPRESS) 1 mg, Oral, 2 times daily    QUEtiapine (SEROQUEL) 100 mg, Oral, Nightly        ROS:  Negative for all symptoms other than those listed in HPI         OBJECTIVE:     Vital signs:   /71 (BP Location: Right arm, Patient Position: Sitting)   Pulse (!) 56   Temp 98.4 °F (36.9 °C) (Oral)   Resp 18   Wt 70.7 kg (155 lb 12.8 oz)   SpO2 100%   BMI 25.93 kg/m²      Physical Examination:  Physical Exam  Constitutional:        "General: He is not in acute distress.     Appearance: He is not ill-appearing.      Comments: disheveled   HENT:      Head: Normocephalic and atraumatic.   Cardiovascular:      Heart sounds: No murmur heard.     No friction rub. No gallop.   Pulmonary:      Effort: No respiratory distress.      Breath sounds: No stridor. No wheezing or rhonchi.   Abdominal:      General: There is no distension.      Palpations: There is no mass.      Tenderness: There is no abdominal tenderness.      Hernia: No hernia is present.   Musculoskeletal:         General: No swelling, tenderness, deformity or signs of injury.   Psychiatric:      Comments: Grinding teeth very loudly           Labs:  BMP:   Lab Results   Component Value Date    CO2 26 06/26/2023    BUN 23.4 (H) 06/26/2023    CREATININE 0.81 06/26/2023    GLUCOSE 73 (L) 06/26/2023    CALCIUM 9.0 06/26/2023     CBC:   Lab Results   Component Value Date    WBC 7.40 06/26/2023    HGB 14.4 06/26/2023    HCT 42.0 06/26/2023    MCV 84.8 06/26/2023    RDW 13.5 06/26/2023     LFTs:   Lab Results   Component Value Date    LABPROT 6.9 06/26/2023    ALBUMIN 4.0 06/26/2023    AST 27 06/26/2023    ALT 34 06/26/2023    ALKPHOS 93 06/26/2023     FLP:   Lab Results   Component Value Date    CHOL 196 08/23/2022    HDL 58 08/23/2022    TRIG 133 08/23/2022    TOTALCHOLEST 3.4 08/23/2022     DM:   Lab Results   Component Value Date    LDLCALC 111.4 08/23/2022    CREATININE 0.81 06/26/2023    CREATRANDUR 66.0 08/23/2022     Thyroid: No results found for: "TSH", "JQGTNQ9HZIM", "N4OAGPQ", "C1RPKJM", "THYROIDAB"  Anemia: No results found for: "IRON", "TIBC", "FERRITIN", "YMILHDTY85", "FOLATE"            ASSESSMENT & PLAN:        Alex cardia    NSR  No QTC prolongation      Schizoaffective disorder  -restart Seroquel      PTSD  Night terrors    -start prazosin.   -pt educated on side effect of low bp.   Bp monitor ordered. I repeatedly ask if it would be possible for him to check is bp nightly. He " reports it is and he has done this regimen in the past.       Referral to psychiatry given.  Labs ordered today  EKG      Health Maintenance  Vaccinations  Immunization History   Administered Date(s) Administered    Influenza - Trivalent - Fluarix, Flulaval, Fluzone, Afluria - PF 10/21/2024    Pneumococcal Conjugate - 20 Valent 10/21/2024    Tdap 04/27/2019               Return to clinic in 3 month(s).    Terese Weir M.D  [facility] PGY-2      Orders Placed This Encounter    Comprehensive Metabolic Panel    HIV 1/2 Ag/Ab (4th Gen)    Hepatitis C Antibody    Ambulatory referral/consult to Psychiatry    IN OFFICE EKG 12-LEAD (to Muse)    influenza (Flulaval, Fluzone, Fluarix) 45 mcg/0.5 mL IM vaccine (> or = 6 mo) 0.5 mL    pneumoc 20-devon conj-dip cr(PF) (PREVNAR-20 (PF)) injection Syrg 0.5 mL    QUEtiapine (SEROQUEL) 100 MG Tab    prazosin (MINIPRESS) 1 MG Cap    blood pressure test kit-large Kit

## 2024-10-21 NOTE — PROGRESS NOTES
I have reviewed and agree with the resident's findings, including all diagnostic interpretations and plans as written.    Patient with hx of paranoid schizophrenia, bipolar, anxiety/depression, substance use (hx of heroin,?PCP) who presents here to establish care. Pt has been off his psych medications. Agree with restarting Seroquel, placing patient on prazosin for PTSD and night terrors, and referring to psychiatry. Pt with asymptomatic bradycardia with EKG showing sinus sana with Qtc <383. Will need updated labs today. Patient will need close follow up. Rest per resident note.    Gala Mcdonald MD

## 2024-10-22 LAB
OHS QRS DURATION: 92 MS
OHS QTC CALCULATION: 383 MS

## 2024-12-26 ENCOUNTER — HOSPITAL ENCOUNTER (EMERGENCY)
Facility: HOSPITAL | Age: 39
Discharge: HOME | End: 2024-12-26
Attending: EMERGENCY MEDICINE
Payer: MEDICAID

## 2024-12-26 VITALS
TEMPERATURE: 98 F | DIASTOLIC BLOOD PRESSURE: 79 MMHG | BODY MASS INDEX: 25.68 KG/M2 | WEIGHT: 154.13 LBS | HEART RATE: 97 BPM | RESPIRATION RATE: 16 BRPM | HEIGHT: 65 IN | OXYGEN SATURATION: 98 % | SYSTOLIC BLOOD PRESSURE: 117 MMHG

## 2024-12-26 DIAGNOSIS — M26.623 BILATERAL TEMPOROMANDIBULAR JOINT PAIN: ICD-10-CM

## 2024-12-26 DIAGNOSIS — S00.83XA CONTUSION OF FACE, INITIAL ENCOUNTER: Primary | ICD-10-CM

## 2024-12-26 PROCEDURE — 63600175 PHARM REV CODE 636 W HCPCS

## 2024-12-26 PROCEDURE — 99284 EMERGENCY DEPT VISIT MOD MDM: CPT | Mod: 25

## 2024-12-26 PROCEDURE — 96372 THER/PROPH/DIAG INJ SC/IM: CPT

## 2024-12-26 RX ORDER — MORPHINE SULFATE 2 MG/ML
2 INJECTION, SOLUTION INTRAMUSCULAR; INTRAVENOUS
Status: DISCONTINUED | OUTPATIENT
Start: 2024-12-26 | End: 2024-12-26

## 2024-12-26 RX ORDER — MORPHINE SULFATE 2 MG/ML
2 INJECTION, SOLUTION INTRAMUSCULAR; INTRAVENOUS
Status: COMPLETED | OUTPATIENT
Start: 2024-12-26 | End: 2024-12-26

## 2024-12-26 RX ORDER — IBUPROFEN 600 MG/1
600 TABLET ORAL EVERY 6 HOURS PRN
Qty: 20 TABLET | Refills: 0 | Status: SHIPPED | OUTPATIENT
Start: 2024-12-26

## 2024-12-26 RX ADMIN — MORPHINE SULFATE 2 MG: 2 INJECTION, SOLUTION INTRAMUSCULAR; INTRAVENOUS at 07:12
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
